# Patient Record
Sex: FEMALE | NOT HISPANIC OR LATINO | URBAN - NONMETROPOLITAN AREA
[De-identification: names, ages, dates, MRNs, and addresses within clinical notes are randomized per-mention and may not be internally consistent; named-entity substitution may affect disease eponyms.]

---

## 2018-03-13 ENCOUNTER — OFFICE VISIT CONVERTED (OUTPATIENT)
Dept: FAMILY MEDICINE CLINIC | Age: 38
End: 2018-03-13
Attending: FAMILY MEDICINE

## 2018-03-27 ENCOUNTER — OFFICE VISIT CONVERTED (OUTPATIENT)
Dept: FAMILY MEDICINE CLINIC | Age: 38
End: 2018-03-27
Attending: NURSE PRACTITIONER

## 2018-07-13 ENCOUNTER — OFFICE VISIT CONVERTED (OUTPATIENT)
Dept: FAMILY MEDICINE CLINIC | Age: 38
End: 2018-07-13
Attending: FAMILY MEDICINE

## 2019-01-18 ENCOUNTER — HOSPITAL ENCOUNTER (OUTPATIENT)
Dept: OTHER | Facility: HOSPITAL | Age: 39
Discharge: HOME | End: 2019-01-18
Attending: FAMILY MEDICINE

## 2019-01-18 ENCOUNTER — OFFICE VISIT CONVERTED (OUTPATIENT)
Dept: FAMILY MEDICINE CLINIC | Age: 39
End: 2019-01-18
Attending: FAMILY MEDICINE

## 2019-01-18 LAB
ALBUMIN SERPL-MCNC: 4.4 G/DL (ref 3.5–5)
ALBUMIN/GLOB SERPL: 1.5 {RATIO} (ref 1.4–2.6)
ALP SERPL-CCNC: 84 U/L (ref 42–98)
ALT SERPL-CCNC: 20 U/L (ref 10–40)
ANION GAP SERPL CALC-SCNC: 18 MMOL/L (ref 8–19)
AST SERPL-CCNC: 17 U/L (ref 15–50)
BILIRUB SERPL-MCNC: 0.24 MG/DL (ref 0.2–1.3)
BUN SERPL-MCNC: 11 MG/DL (ref 5–25)
BUN/CREAT SERPL: 15 {RATIO} (ref 6–20)
CALCIUM SERPL-MCNC: 9.5 MG/DL (ref 8.7–10.4)
CHLORIDE SERPL-SCNC: 101 MMOL/L (ref 99–111)
CHOLEST SERPL-MCNC: 184 MG/DL (ref 107–200)
CHOLEST/HDLC SERPL: 5.3 {RATIO} (ref 3–6)
CONV CO2: 25 MMOL/L (ref 22–32)
CONV TOTAL PROTEIN: 7.4 G/DL (ref 6.3–8.2)
CREAT UR-MCNC: 0.75 MG/DL (ref 0.5–0.9)
GFR SERPLBLD BASED ON 1.73 SQ M-ARVRAT: >60 ML/MIN/{1.73_M2}
GLOBULIN UR ELPH-MCNC: 3 G/DL (ref 2–3.5)
GLUCOSE SERPL-MCNC: 93 MG/DL (ref 65–99)
HDLC SERPL-MCNC: 35 MG/DL (ref 40–60)
LDLC SERPL CALC-MCNC: 87 MG/DL (ref 70–100)
OSMOLALITY SERPL CALC.SUM OF ELEC: 287 MOSM/KG (ref 273–304)
POTASSIUM SERPL-SCNC: 4.7 MMOL/L (ref 3.5–5.3)
SODIUM SERPL-SCNC: 139 MMOL/L (ref 135–147)
TRIGL SERPL-MCNC: 312 MG/DL (ref 40–150)
TSH SERPL-ACNC: 0.98 M[IU]/L (ref 0.27–4.2)
VLDLC SERPL-MCNC: 62 MG/DL (ref 5–37)

## 2019-04-26 ENCOUNTER — OFFICE VISIT CONVERTED (OUTPATIENT)
Dept: FAMILY MEDICINE CLINIC | Age: 39
End: 2019-04-26
Attending: NURSE PRACTITIONER

## 2019-05-15 ENCOUNTER — OFFICE VISIT CONVERTED (OUTPATIENT)
Dept: FAMILY MEDICINE CLINIC | Age: 39
End: 2019-05-15
Attending: NURSE PRACTITIONER

## 2019-05-15 ENCOUNTER — HOSPITAL ENCOUNTER (OUTPATIENT)
Dept: OTHER | Facility: HOSPITAL | Age: 39
Discharge: HOME | End: 2019-05-15
Attending: NURSE PRACTITIONER

## 2019-05-30 ENCOUNTER — OFFICE VISIT CONVERTED (OUTPATIENT)
Dept: FAMILY MEDICINE CLINIC | Age: 39
End: 2019-05-30
Attending: FAMILY MEDICINE

## 2019-06-06 ENCOUNTER — HOSPITAL ENCOUNTER (OUTPATIENT)
Dept: MRI IMAGING | Facility: HOSPITAL | Age: 39
Discharge: HOME | End: 2019-06-06
Attending: FAMILY MEDICINE

## 2019-08-09 ENCOUNTER — HOSPITAL ENCOUNTER (OUTPATIENT)
Dept: OTHER | Facility: HOSPITAL | Age: 39
Discharge: HOME | End: 2019-08-09
Attending: PHYSICIAN ASSISTANT

## 2019-08-09 ENCOUNTER — OFFICE VISIT CONVERTED (OUTPATIENT)
Dept: NEUROSURGERY | Facility: CLINIC | Age: 39
End: 2019-08-09
Attending: PHYSICIAN ASSISTANT

## 2021-05-15 VITALS
DIASTOLIC BLOOD PRESSURE: 101 MMHG | WEIGHT: 293 LBS | SYSTOLIC BLOOD PRESSURE: 156 MMHG | HEIGHT: 64 IN | BODY MASS INDEX: 50.02 KG/M2

## 2021-05-18 NOTE — PROGRESS NOTES
Sherrill ChapmanKarie 1980     Office/Outpatient Visit    Visit Date:  10:26 am    Provider: Hermila Arshad N.P. (Assistant: Deysi Barraza MA)    Location: [facility]        Electronically signed by Hermila Arshad N.P. on  2019 11:15:33 AM                             SUBJECTIVE:        CC:     Renetta is a 39 year old White female.  presents today due to complaints of left hip pain that radiates down leg, numbness X 1 month         HPI:         Patient to be evaluated for back pain.  The discomfort is most prominent in the lower, left lumbar spine.  This radiates to the left buttock and left posterior thigh.  She characterizes it as pinching.  She states that the current episode of pain started one month ago.  She does not recall any precipitating event or injury.  Other details: Has taken Ibuprofen, Aleve, Naproxen, OTC sciatic pain medication (Tylenol, water pill), heating pad, ice, low back pain exercises that she saw on the internet..      ROS:     CONSTITUTIONAL:  Negative for chills and fever.      CARDIOVASCULAR:  Negative for chest pain and palpitations.      RESPIRATORY:  Negative for dyspnea and frequent wheezing.      GASTROINTESTINAL:  Negative for abdominal pain and vomiting.      GENITOURINARY:  Negative for dysuria and frequent urination.      MUSCULOSKELETAL:  Positive for back pain.      NEUROLOGICAL:  Positive for paresthesias.          PMH/FMH/SH:     Last Reviewed on 2019 09:50 AM by Seamus Manzano    Past Medical History:             GYNECOLOGICAL HISTORY:             CURRENT MEDICAL PROVIDERS:    Obstetrician/Gynecologist         Surgical History:         Cholecystectomy    : X 1;      L Leg Vascular Tumor;         Family History:     Father:    Positive for Hypertension;     ; Positive for sleep apnea;     Mother:    Positive for Hypertension;     ; Positive for Depression;     Sister(s):    Positive for Depression;         Social  History:     Occupation: [employer]     Marital Status: Single     Children: 1 child         Tobacco/Alcohol/Supplements:     Last Reviewed on 1/18/2019 09:50 AM by Seamus Manzano    Tobacco: Current Smoker: She currently smokes every day, 1-5 cigarettes per day.          Substance Abuse History:     Last Reviewed on 1/18/2019 09:50 AM by Seamus Manzano    NEGATIVE         Mental Health History:     Last Reviewed on 1/18/2019 09:50 AM by Seamus Manzano        Communicable Diseases (eg STDs):     Last Reviewed on 1/18/2019 09:50 AM by Seamus Manzano            Current Problems:     Last Reviewed on 1/18/2019 09:50 AM by Seamus Manzano    Low back pain     Nasal Congestion     Patient visit for long term (current) use of other drugs     Use of high risk medications     Morbid obesity     Depression with anxiety     Elevated cholesterol     Cigarette smoking     GERD     Hypertension         Immunizations:     DTaP 1981     DTaP 1981     DTaP 1981     DTaP 8/24/1982     DTaP 3/4/1986     Td adult 9/20/1994     OPV  Poliovirus, live (oral) 1981     OPV  Poliovirus, live (oral) 1981     OPV  Poliovirus, live (oral) 8/24/1982     OPV  Poliovirus, live (oral) 3/4/1986     MMR  (Measles-Mumps-Rubella), live 10/16/1991     MMR  (Measles-Mumps-Rubella), live 5/6/1982     Fluzone Quadrivalent (3+ years) 1/18/2019         Allergies:     Last Reviewed on 1/18/2019 09:50 AM by Seamus Manzano    Fish Oil: vomiting        Current Medications:     Last Reviewed on 1/18/2019 09:50 AM by Seamus Manzano    Xanax 1mg Tablet Take 1 tablet(s) by mouth bid prn     Celexa 40mg Tablet Take 1 tablet(s) by mouth daily     Lisinopril/Hydrochlorothiazide 20mg/12.5mg Tablet Take 1 tablet(s) by mouth daily     Simvastatin 20mg Tablet Take 1 tablet(s) by mouth at bedtime     Zantac 150mg Tablet Take 1 tablet(s) by mouth bid prn     Zyrtec 10mg Tablet Take 1 tab(s)  by mouth qhs     Mirena 52mg Intrauterine System as directed         OBJECTIVE:        Vitals:         Current: 4/26/2019 10:31:42 AM    Ht:  5 ft, 4.75 in;  Wt: 310.4 lbs;  BMI: 52.1    T: 98 F (oral);  BP: 133/89 mm Hg (left arm, sitting);  P: 90 bpm (left arm (BP Cuff), sitting);  sCr: 0.75 mg/dL;  GFR: 137.88        Exams:     PHYSICAL EXAM:     GENERAL: well developed, well nourished;  no apparent distress;     RESPIRATORY: normal respiratory rate and pattern with no distress; normal breath sounds with no rales, rhonchi, wheezes or rubs;     CARDIOVASCULAR: normal rate; rhythm is regular;     MUSCULOSKELETAL: gait: slowed;  decreased range of motion noted in: back flexion, extension, and lateral flexion;  pain with range of motion in: back flexion, extension, and lateral flexion;     NEUROLOGIC: mental status: alert and oriented x 3; GROSSLY INTACT     PSYCHIATRIC: appropriate affect and demeanor;         Procedures:     Back pain     1. Kenalog 60 mg given IM in the right hip; administered by [facility];  lot number [id number]; expires 12/2019             ASSESSMENT           724.5   M54.32  Back pain              DDx:     305.1   F17.200  Cigarette smoking              DDx:     278.01   E66.01  Morbid obesity              DDx:         ORDERS:         Meds Prescribed:       Cyclobenzaprine HCl 10mg Tablet 1 tablet q8h PRN muscle spasm; may cause drowsiness  #20 (Twenty) tablet(s) Refills: 0       Prednisone 10mg Tablet take 6 pills today, 5 pills tomorrow, 4 pills day 3, 3 pills day 4, 2 pills day 5 and 1 pill day 6  #21 (Twenty One) tablet(s) Refills: 0         Other Orders:       64053  Therapeutic injection  (In-House)         4004F  Pt scrnd tobacco use rcvd tobacco cessation talk  (In-House)           Calculated BMI above the upper parameter and a follow-up plan was documented in the medical record  (In-House)           Kenalog, per 10 mg (x6)                 PLAN:          Back pain Consider physical  therapy if no improvement with medications. Will call back in 2 weeks if she wants to schedule appt.     Steroids Kenalog 60 mg 1.5 ml     FOLLOW-UP: Schedule follow-up appointments on a p.r.n. basis. Chronic visit follow up     RECOMMENDATIONS given include: alternating cold packs and moist heat, weight loss, smoking cessation, and Stand and take stretch breaks and walk frequently at work..            Prescriptions:       Cyclobenzaprine HCl 10mg Tablet 1 tablet q8h PRN muscle spasm; may cause drowsiness  #20 (Twenty) tablet(s) Refills: 0       Prednisone 10mg Tablet take 6 pills today, 5 pills tomorrow, 4 pills day 3, 3 pills day 4, 2 pills day 5 and 1 pill day 6  #21 (Twenty One) tablet(s) Refills: 0           Orders:       [id number]  Therapeutic injection  (In-House)                     Kenalog, per 10 mg (x6)           Patient Education Handouts:       Exercises - Treatment for Low Back Pain    PTC           Cigarette smoking         RECOMMENDATIONS given include: Counseled on smoking cessation and advised of the benefits to patient's health if she were to stop smoking. Counseling for less than 3 minutes.            Orders:       4004F  Pt scrnd tobacco use rcvd tobacco cessation talk  (In-House)            Morbid obesity     MIPS     BMI Elevated - Follow-Up Plan: She was provided education on weight loss strategies           Orders:         Calculated BMI above the upper parameter and a follow-up plan was documented in the medical record  (In-House)               Patient Recommendations:        For  Back pain:     Alternate cold packs and moist heat for pain relief.     Weight loss will make a tremendous difference in the daily load your back has to support. Losing weight is key to becoming pain free. Stop smoking in order to improve oxygen delivery to the spinal tissues, and therefore help decrease problems with your back over time. I also recommend Stand and take stretch breaks and walk frequently at  work..  Schedule follow-up appointments as needed.          For  Cigarette smoking:         Stop smoking.              CHARGE CAPTURE           **Please note: ICD descriptions below are intended for billing purposes only and may not represent clinical diagnoses**        Primary Diagnosis:         724.5 Back pain            M54.32    Sciatica, left side              Orders:          51704   Office/outpatient visit; established patient, level 3  (In-House)             81582   Therapeutic injection  (In-House)                                           Kenalog, per 10 mg (x6)         305.1 Cigarette smoking            F17.200    Nicotine dependence, unspecified, uncomplicated              Orders:          4004F   Pt scrnd tobacco use rcvd tobacco cessation talk  (In-House)           278.01 Morbid obesity            E66.01    Morbid (severe) obesity due to excess calories              Orders:             Calculated BMI above the upper parameter and a follow-up plan was documented in the medical record  (In-House)

## 2021-05-18 NOTE — PROGRESS NOTES
Sherrill ChapmanKarie 1980     Office/Outpatient Visit    Visit Date: Tue, Mar 27, 2018 12:01 pm    Provider: Apolonia Kimbrough N.P. (Assistant: Philly Erwin MA)    Location: [facility]        Electronically signed by Apolonia Kimbrough N.P. on  2018 09:50:33 PM                             SUBJECTIVE:        CC:     Renetta is a 38 year old White female.  sinuses/cough/allergies;         HPI:         Renetta presents with uRI.  These have been present since yesterday.  The symptoms include progressive, dry cough, nasal congestion and wheezing.  She denies fever or nasal discharge.  She reports recent exposure to illness from family members.  She has already tried to relieve the symptoms with robitussin, loratadine, albuterol.      ROS:     CONSTITUTIONAL:  Positive for fatigue.   Negative for chills or fever.      E/N/T:  Positive for nasal congestion.   Negative for ear pain, frequent rhinorrhea or sore throat.      CARDIOVASCULAR:  Negative for chest pain, palpitations, tachycardia, orthopnea, and edema.      RESPIRATORY:  Positive for recent cough ( typically dry ), dyspnea and frequent wheezing.      GASTROINTESTINAL:  Positive for nausea.   Negative for abdominal pain, constipation, diarrhea or vomiting.      MUSCULOSKELETAL:  Negative for arthralgias, back pain, and myalgias.      NEUROLOGICAL:  Positive for headaches.   Negative for dizziness, fainting or weakness.          PMH/FMH/SH:     Last Reviewed on 3/13/2018 02:04 PM by Seamus Manzano    Past Medical History:             GYNECOLOGICAL HISTORY:             CURRENT MEDICAL PROVIDERS:    Obstetrician/Gynecologist         Surgical History:         Cholecystectomy    : X 1;      L Leg Vascular Tumor;         Family History:     Father:    Positive for Hypertension;     ; Positive for sleep apnea;     Mother:    Positive for Hypertension;     ; Positive for Depression;     Sister(s):    Positive for Depression;          Social History:     Occupation: [employer]     Marital Status: Single     Children: 1 child         Tobacco/Alcohol/Supplements:     Last Reviewed on 3/13/2018 02:04 PM by Seamus Manzano    Tobacco: Current Smoker: She currently smokes every day, 1/2 pack per day.          Substance Abuse History:     Last Reviewed on 3/13/2018 02:04 PM by Seamus Manzano    NEGATIVE         Mental Health History:     Last Reviewed on 3/13/2018 02:04 PM by Seamus Manzano        Communicable Diseases (eg STDs):     Last Reviewed on 3/13/2018 02:04 PM by Seamus Manzano            Current Problems:     Last Reviewed on 3/13/2018 02:04 PM by Seamus Manzano    Patient visit for long term (current) use of other drugs     Use of high risk medications     Morbid obesity     Depression with anxiety     Elevated cholesterol     Cigarette smoking     GERD     Hypertension         Immunizations:     DTaP 1981     DTaP 1981     DTaP 1981     DTaP 8/24/1982     DTaP 3/4/1986     Td adult 9/20/1994     OPV  Poliovirus, live (oral) 1981     OPV  Poliovirus, live (oral) 1981     OPV  Poliovirus, live (oral) 8/24/1982     OPV  Poliovirus, live (oral) 3/4/1986     MMR  (Measles-Mumps-Rubella), live 10/16/1991     MMR  (Measles-Mumps-Rubella), live 5/6/1982         Allergies:     Last Reviewed on 3/13/2018 02:04 PM by Seamus Manzano    Fish Oil: vomiting        Current Medications:     Last Reviewed on 3/27/2018 12:07 PM by Philly Erwin    Lisinopril/Hydrochlorothiazide 20mg/12.5mg Tablet Take 1 tablet(s) by mouth daily     Simvastatin 20mg Tablet Take 1 tablet(s) by mouth at bedtime     Xanax 1mg Tablet Take 1 tablet(s) by mouth bid prn     Zantac 150mg Tablet Take 1 tablet(s) by mouth bid prn     Zyrtec 10mg Tablet Take 1 tab(s) by mouth qhs     Celexa 40mg Tablet Take 1 tablet(s) by mouth daily     Mirena 52mg Intrauterine System as directed     Doxycycline Monohydrate  100mg Capsules Take 1 capsule(s) by mouth bid x10 days     Nicotine Polacrilex 4mg/1square Gum 4 mg PO q1-2h x6wk, then q2-4h x3wk, then q4-8h x3wk; Start: on cigarette quit day; Max: 24 pieces/24h     Loratadine 10mg Tablet 1 tab daily         OBJECTIVE:        Vitals:         Historical:     03/13/2018  BP:   146/94 mm Hg ( (left arm, , sitting, );)     11/09/2017  BP:   139/88 mm Hg ( (left arm, , sitting, );)     03/13/2018  Wt:   289.2lbs        Current: 3/27/2018 12:04:24 PM    Ht:  5 ft, 4.75 in;  Wt: 288.9 lbs;  BMI: 48.4    T: 97.9 F (oral);  BP: 144/93 mm Hg (left arm, sitting);  P: 88 bpm (left arm (BP Cuff), sitting);  sCr: 0.79 mg/dL;  GFR: 128.18        Exams:     PHYSICAL EXAM:     GENERAL: tired-appearing;     E/N/T: EARS: bilateral TMs are normal;  NOSE: nasal mucosa is erythematous;  OROPHARYNX: posterior pharynx, including tonsils, tongue, and uvula are normal;     RESPIRATORY: normal respiratory rate and pattern with no distress; diffuse expiratory wheezes;     CARDIOVASCULAR: normal rate; rhythm is regular;     LYMPHATIC: no enlargement of cervical or facial nodes;     MUSCULOSKELETAL:  Normal range of motion, strength and tone;     NEUROLOGIC: mental status: alert and oriented x 3; GROSSLY INTACT     PSYCHIATRIC:  appropriate affect and demeanor; normal speech pattern; grossly normal memory;         ASSESSMENT           466.0   J20.9  Acute bronchitis              DDx:     478.19   R09.81  Nasal Congestion              DDx:         ORDERS:         Meds Prescribed:       Albuterol 90mcg/1actuation Oral Inhaler 1 to 2 puffs q 4 -6 hours prn  #1 (One) inhaler(s) Refills: 0       Promethazine with Dextromethrophan 6.25mg/15mg per 5ml Syrup 1 to 2 tsp every 4 to 6 hours as needed for cough  #6 (Six) oz Refills: 0       Prednisone 5mg Tablet 8 pills day 1, 6 pills day 2, 4 pills on day three, 2 pills on day 4, and 1 pill on day 5  #21 (Twenty One) tablet(s) Refills: 0       Flonase Allergy Relief  (Fluticasone Propionate) 50mcg/1actuation Nasal Spray Use 1 spray(s) in each nostril daily  #15.8 (Fifteen point Eight) ml Refills: 0                 PLAN:          Acute bronchitis         RECOMMENDATIONS given include: rest, increase oral fluid intake, and promethazine dm may cause drowsiness.  follow up if not improving..            Prescriptions:       Albuterol 90mcg/1actuation Oral Inhaler 1 to 2 puffs q 4 -6 hours prn  #1 (One) inhaler(s) Refills: 0       Promethazine with Dextromethrophan 6.25mg/15mg per 5ml Syrup 1 to 2 tsp every 4 to 6 hours as needed for cough  #6 (Six) oz Refills: 0       Prednisone 5mg Tablet 8 pills day 1, 6 pills day 2, 4 pills on day three, 2 pills on day 4, and 1 pill on day 5  #21 (Twenty One) tablet(s) Refills: 0           Patient Education Handouts:       Acute Bronchitis           Nasal Congestion           Prescriptions:       Flonase Allergy Relief (Fluticasone Propionate) 50mcg/1actuation Nasal Spray Use 1 spray(s) in each nostril daily  #15.8 (Fifteen point Eight) ml Refills: 0             CHARGE CAPTURE           **Please note: ICD descriptions below are intended for billing purposes only and may not represent clinical diagnoses**        Primary Diagnosis:         466.0 Acute bronchitis            J20.9    Acute bronchitis, unspecified              Orders:          85117   Office/outpatient visit; established patient, level 3  (In-House)           478.19 Nasal Congestion            R09.81    Nasal congestion

## 2021-05-18 NOTE — PROGRESS NOTES
Sherrill ChapmanKarie 1980     Office/Outpatient Visit    Visit Date: Wed, May 15, 2019 08:26 am    Provider: Radha Velazquez N.P. (Assistant: Sarah Spurling, MA)    Location: [facility]        Electronically signed by Radha Velazquez N.P. on  05/15/2019 09:11:53 AM                             SUBJECTIVE:        CC:     Renetta is a 39 year old White female.  Pt is here for left leg pain. She has been here before for it. Sometimes the pain is so bad it makes her sick.;         HPI:         Renetta presents with leg pain.  Renetta sustained an injury to.  No precipitating event or injury is identified.  Pain started 1 to 2 weeks ago.  Associated symptoms include burning in her left buttock  shoots down the leg.  Current pain level on a scale of 1 to 10 is a 7.  Pain radiates to: thighs lower legs ankles.  She characterizes the pain as severe and burning.  Pain improves with feels better when standing.  taking Norco that she has for tooth pain - not working at all     ROS:     CONSTITUTIONAL:  Negative for chills, fatigue, fever, and weight change.      CARDIOVASCULAR:  Negative for chest pain, orthopnea, paroxysmal nocturnal dyspnea and pedal edema.      RESPIRATORY:  Negative for dyspnea.      MUSCULOSKELETAL:  Positive for back pain ( chronic ) and limb pain ( left leg pain ).      NEUROLOGICAL:  Negative for paresthesias.      PSYCHIATRIC:  Negative for anxiety, depression, and sleep disturbances.          PMH/FMH/SH:     Last Reviewed on 5/15/2019 08:38 AM by Radha Velazquez    Past Medical History:             GYNECOLOGICAL HISTORY:             CURRENT MEDICAL PROVIDERS:    Obstetrician/Gynecologist         Surgical History:         Cholecystectomy    : X 1;      L Leg Vascular Tumor;         Family History:     Father:    Positive for Hypertension;     ; Positive for sleep apnea;     Mother:    Positive for Hypertension;     ; Positive for Depression;     Sister(s):    Positive for  Depression;         Social History:     Occupation: Mental health facility     Marital Status: Single     Children: 1 child         Tobacco/Alcohol/Supplements:     Last Reviewed on 5/15/2019 08:28 AM by Spurling, Sarah C    Tobacco: Current Smoker: She currently smokes every day, 1/2 pack per day.          Substance Abuse History:     Last Reviewed on 1/18/2019 09:50 AM by Seamus Manzano    NEGATIVE         Mental Health History:     Last Reviewed on 1/18/2019 09:50 AM by Seamus Manzano        Communicable Diseases (eg STDs):     Last Reviewed on 1/18/2019 09:50 AM by Seamus Manzano            Current Problems:     Last Reviewed on 5/15/2019 08:38 AM by Radha Velazquez    Back pain     Low back pain     Nasal Congestion     Patient visit for long term (current) use of other drugs     Use of high risk medications     Morbid obesity     Depression with anxiety     Elevated cholesterol     Cigarette smoking     GERD     Hypertension     Leg pain         Immunizations:     DTaP 1981     DTaP 1981     DTaP 1981     DTaP 8/24/1982     DTaP 3/4/1986     Td adult 9/20/1994     OPV  Poliovirus, live (oral) 1981     OPV  Poliovirus, live (oral) 1981     OPV  Poliovirus, live (oral) 8/24/1982     OPV  Poliovirus, live (oral) 3/4/1986     MMR  (Measles-Mumps-Rubella), live 10/16/1991     MMR  (Measles-Mumps-Rubella), live 5/6/1982     Fluzone Quadrivalent (3+ years) 1/18/2019         Allergies:     Last Reviewed on 5/15/2019 08:28 AM by Spurling, Sarah C    Fish Oil: vomiting        Current Medications:     Last Reviewed on 5/15/2019 08:38 AM by Radha Velazquez    Xanax 1mg Tablet Take 1 tablet(s) by mouth bid prn     Celexa 40mg Tablet Take 1 tablet(s) by mouth daily     Lisinopril/Hydrochlorothiazide 20mg/12.5mg Tablet Take 1 tablet(s) by mouth daily     Simvastatin 20mg Tablet Take 1 tablet(s) by mouth at bedtime     Zantac 150mg Tablet Take 1 tablet(s) by mouth bid prn      Zyrtec 10mg Tablet Take 1 tab(s) by mouth qhs     Mirena 52mg Intrauterine System as directed     Backaid Max 6hr formula - pt takes this PRN for leg pain     Diclofenac Sodium 75mg Tablets, Enteric Coated 1 tab bid     Norco 5mg/325mg Tablet PRN for tooth pain         OBJECTIVE:        Vitals:         Current: 5/15/2019 8:35:01 AM    Ht:  5 ft, 4.75 in;  Wt: 307 lbs;  BMI: 51.5    T: 98 F (oral);  BP: 145/97 mm Hg (left arm, sitting);  P: 68 bpm (left arm (BP Cuff), sitting);  sCr: 0.75 mg/dL;  GFR: 137.24        Repeat:     8:36:22 AM     BP:   134/88mm Hg (right arm, sitting, second take.)         Exams:     PHYSICAL EXAM:     GENERAL: vital signs recorded - well developed, well nourished, obese;  no apparent distress, seems to be in moderate pain;     EYES: extraocular movements intact; conjunctiva and cornea are normal; PERRLA;     E/N/T:  normal EACs, TMs, nasal/oral mucosa, teeth, gingiva, and oropharynx;     NECK: range of motion is normal; thyroid is non-palpable;     RESPIRATORY: normal respiratory rate and pattern with no distress; normal breath sounds with no rales, rhonchi, wheezes or rubs;     CARDIOVASCULAR: normal rate; rhythm is regular;  no systolic murmur; no edema;     MUSCULOSKELETAL: gait: affected by a limp and stooped;     NEUROLOGICAL:  cranial nerves, motor and sensory function, reflexes, gait and coordination are all intact;     PSYCHIATRIC:  appropriate affect and demeanor; normal speech pattern; grossly normal memory;         Procedures:     Leg pain     1. Toradol 60 mg given IM in the left hip; administered by [facility];  lot number -[id number]; expires 12-01-19             ASSESSMENT:           729.5   M79.605   M54.32  Leg pain              DDx:         ORDERS:         Radiology/Test Orders:       [id number]  Left radiologic exam, hip, unilateral; complete, minimum of two views  (Send-Out)         87503  Radiologic examination, spine, lumbosacral;  minimum of four views  (Send-Out)            Procedures Ordered:       REFER  Referral to Specialist or Other Facility  (Send-Out)           Other Orders:       [id number]  Therapeutic injection  (In-House)           Toradol, per 15 mg (x4)                 PLAN:          Leg pain we may need to get MRI after PT if this is not resolved - Instructed to continue the diclofenac as prescribed take routinely, avoid use of otehr NSAIDs while taking         RADIOLOGY:  I have ordered a left hip x-ray and Lumbar/Sacral Spine X-ray to be done today.      REFERRALS:  Referral initiated to physical therapy ( [facility] ).  Narcotic or pain medication Toradol           Orders:       [id number]  Left radiologic exam, hip, unilateral; complete, minimum of two views  (Send-Out)         07609  Radiologic examination, spine, lumbosacral;  minimum of four views  (Send-Out)         REFER  Referral to Specialist or Other Facility  (Send-Out)         97277  Therapeutic injection  (In-House)                     Toradol, per 15 mg (x4)             CHARGE CAPTURE:           Primary Diagnosis:     729.5 Leg pain            M79.605    Pain in left leg           M54.32    Sciatica, left side              Orders:          69191   Office/outpatient visit; established patient, level 3  (In-House)             63977   Therapeutic injection  (In-House)                                           Toradol, per 15 mg (x4)

## 2021-05-18 NOTE — PROGRESS NOTES
Sherrill ChapmanKarie 1980     Office/Outpatient Visit    Visit Date:  09:25 am    Provider: Seamus Manzano MD (Assistant: Ama Joy RN)    Location: [facility]        Electronically signed by Seamus Manzano MD on  2019 05:14:11 PM                             SUBJECTIVE:        CC: follow up - anxiety, blood pressure, lipids         HPI:     Renetta in today for follow up on generalized anxiety disorder.  She has severe anxiety for which she previously saw psychiatry.  She was placed Xanax and citalopram by them about 20 years ago.  She has tried to wean off of Xanax, but she could not function well without it.  She denies abuse, diversion, or doctor shopping.  Fermin is okay.  She is due for drug screen today.  She is not aware of side effects from the medication.  Risks are discussed including addiction, impairment, abuse and overdose potential.         Additionally, she presents with history of hypertension.  her current cardiac medication regimen includes a combination medication ( Zestoretic ).  She did not bring her blood pressure diary, but says that pressures have been okay.  Compliance with treatment has been good.          Elevated cholesterol details; current treatment includes Zocor and diet.  Compliance with treatment has been good; she takes her medication as directed and follows up as directed.  She denies experiencing any hypercholesterolemia related symptoms.      ROS:     CONSTITUTIONAL:  Negative for chills and fever.      CARDIOVASCULAR:  Negative for chest pain and palpitations.      RESPIRATORY:  Negative for recent cough and dyspnea.      GASTROINTESTINAL:  Negative for abdominal pain, nausea and vomiting.      INTEGUMENTARY/BREAST:  Negative for atypical mole(s) and rash.          PMH/FMH/SH:     Last Reviewed on 2019 09:50 AM by Seamus Manzano    Past Medical History:             GYNECOLOGICAL HISTORY:             CURRENT MEDICAL  PROVIDERS:    Obstetrician/Gynecologist         Surgical History:         Cholecystectomy    : X 1;      L Leg Vascular Tumor;         Family History:     Father:    Positive for Hypertension;     ; Positive for sleep apnea;     Mother:    Positive for Hypertension;     ; Positive for Depression;     Sister(s):    Positive for Depression;         Social History:     Occupation:  [employer]     Marital Status: Single     Children: 1 child         Tobacco/Alcohol/Supplements:     Last Reviewed on 2019 09:50 AM by Seamus Manzano    Tobacco: Current Smoker: She currently smokes every day, 1-5 cigarettes per day.          Substance Abuse History:     Last Reviewed on 2019 09:50 AM by Seamus Manzano    NEGATIVE         Mental Health History:     Last Reviewed on 2019 09:50 AM by Seamus Manzano        Communicable Diseases (eg STDs):     Last Reviewed on 2019 09:50 AM by Seamus Manzano            Current Problems:     Last Reviewed on 2019 09:50 AM by Seamus Manzano    Low back pain     Nasal Congestion     Patient visit for long term (current) use of other drugs     Use of high risk medications     Morbid obesity     Depression with anxiety     Elevated cholesterol     Cigarette smoking     GERD     Hypertension         Immunizations:     DTaP 1981     DTaP 1981     DTaP 1981     DTaP 1982     DTaP 3/4/1986     Td adult 1994     OPV  Poliovirus, live (oral) 1981     OPV  Poliovirus, live (oral) 1981     OPV  Poliovirus, live (oral) 1982     OPV  Poliovirus, live (oral) 3/4/1986     MMR  (Measles-Mumps-Rubella), live 10/16/1991     MMR  (Measles-Mumps-Rubella), live 1982         Allergies:     Last Reviewed on 2019 09:50 AM by Seamus Manzano    Fish Oil: vomiting        Current Medications:     Last Reviewed on 2019 09:50 AM by Seamus Manzano    Celexa 40mg Tablet Take 1  tablet(s) by mouth daily     Xanax 1mg Tablet Take 1 tablet(s) by mouth bid prn     Lisinopril/Hydrochlorothiazide 20mg/12.5mg Tablet Take 1 tablet(s) by mouth daily     Simvastatin 20mg Tablet Take 1 tablet(s) by mouth at bedtime     Zantac 150mg Tablet Take 1 tablet(s) by mouth bid prn     Zyrtec 10mg Tablet Take 1 tab(s) by mouth qhs     Mirena 52mg Intrauterine System as directed     Loratadine 10mg Tablet 1 tab daily         OBJECTIVE:        Vitals:         Current: 1/18/2019 9:30:39 AM    Ht:  5 ft, 4.75 in;  Wt: 303.2 lbs;  BMI: 50.8    T: 97.8 F (oral);  BP: 142/85 mm Hg (left arm, sitting);  P: 104 bpm (left arm (BP Cuff), sitting);  sCr: 0.75 mg/dL;  GFR: 137.82        Exams:     PHYSICAL EXAM:     GENERAL: vital signs recorded - well developed,  moderately obese;  no apparent distress;     EYES: extraocular movements intact; conjunctiva and cornea are normal; PERRLA;     E/N/T:  normal EACs, TMs, nasal/oral mucosa, teeth, gingiva, and oropharynx;     NECK: range of motion is normal; thyroid is non-palpable;     RESPIRATORY: normal respiratory rate and pattern with no distress; normal breath sounds with no rales, rhonchi, wheezes or rubs;     CARDIOVASCULAR: normal rate; rhythm is regular;  no systolic murmur; no edema;     GASTROINTESTINAL: nontender; normal bowel sounds; no organomegaly;     BREAST/INTEGUMENT: SKIN: no significant rashes or lesions; no suspicious moles;     PSYCHIATRIC:  appropriate affect and demeanor; normal speech pattern; grossly normal memory;         Procedures:     Influenza vaccination     1. Influenza, seasonal (children 3 years to adult): 0.5 ml unit dose given IM in the right upper arm; administered by [facility];  lot number [id number]; expires 06/30/2019 Regarding contraindications to an Influenza vaccine: Denies moderate/severe illness with/without fever; serious reaction to eggs, egg proteins, gentamicin, gelatin, arginine, neomycin or polymixin; serious reaction after recieving  previous influenza vaccines; and history of Guillain-Houston Syndrome.              ASSESSMENT           V58.69   Z79.899  Use of high risk medications              DDx:     300.4   F41.1  Depression with anxiety              DDx:     401.1   I10  Hypertension              DDx:     272.0   E78.1  Elevated cholesterol              DDx:     278.01   E66.01  Morbid obesity              DDx:     V04.81   Z23  Influenza vaccination              DDx:         ORDERS:         Meds Prescribed:       Refill of: Celexa (Citalopram Hydrobromide) 40mg Tablet Take 1 tablet(s) by mouth daily  #90 (Ninety) tablet(s) Refills: 1       Refill of: Xanax (Alprazolam) 1mg Tablet Take 1 tablet(s) by mouth bid prn  #60 (Sixty) tablet(s) Refills: 2       Refill of: Lisinopril/Hydrochlorothiazide 20mg/12.5mg Tablet Take 1 tablet(s) by mouth daily  #90 (Ninety) tablet(s) Refills: 1       Refill of: Simvastatin 20mg Tablet Take 1 tablet(s) by mouth at bedtime  #90 (Ninety) tablet(s) Refills: 1       Refill of: Zantac (Ranitidine) 150mg Tablet Take 1 tablet(s) by mouth bid prn  #180 (One [place] and [place]) tablet(s) Refills: 1       Refill of: Zyrtec (Cetirizine HCl) 10mg Tablet Take 1 tab(s) by mouth qhs  #90 (Ninety) tablet(s) Refills: 1         Lab Orders:       12803  HTNLP - [facility] CMP AND LIPID: [id number], [id number]  (Send-Out)         23164  TSH - [facility] TSH  (Send-Out)           Procedures Ordered:       73698  Immunization administration; one vaccine  (In-House)           Other Orders:       31316  Influenza virus vaccine, quadrivalent, split virus, preservative free 3 years of age & older  (In-House)         4004F  Pt scrnd tobacco use rcvd tobacco cessation talk  (In-House)           Calculated BMI above the upper parameter and a follow-up plan was documented in the medical record  (In-House)                   PLAN:          Use of high risk medications         RECOMMENDATIONS given include: Today, we have reviewed Renetta's care.  We will  refill her medications as noted below and go from there.  We discussed possibly starting to wean Xanax, but no change is made presently.  If we do that, we will have to be very careful given her history.  Her other problems are noted.  We will arrange labs today..  MIPS Smoking cessation encouraged. Counseling for less than 3 minutes.      BMI Elevated - Follow-Up Plan: She was provided education on weight loss strategies           Orders:       4004F  Pt scrnd tobacco use rcvd tobacco cessation talk  (In-House)           Calculated BMI above the upper parameter and a follow-up plan was documented in the medical record  (In-House)             Patient Education Handouts:       Controlled Prescription Drug education           Depression with anxiety           Prescriptions:       Refill of: Celexa (Citalopram Hydrobromide) 40mg Tablet Take 1 tablet(s) by mouth daily  #90 (Ninety) tablet(s) Refills: 1       Refill of: Xanax (Alprazolam) 1mg Tablet Take 1 tablet(s) by mouth bid prn  #60 (Sixty) tablet(s) Refills: 2          Hypertension           Prescriptions:       Refill of: Lisinopril/Hydrochlorothiazide 20mg/12.5mg Tablet Take 1 tablet(s) by mouth daily  #90 (Ninety) tablet(s) Refills: 1          Elevated cholesterol     LABORATORY:  Labs ordered to be performed today include HTN/Lipid Panel: CMP, Lipid and TSH.            Prescriptions:       Refill of: Simvastatin 20mg Tablet Take 1 tablet(s) by mouth at bedtime  #90 (Ninety) tablet(s) Refills: 1           Orders:       49100  HTNLP - [facility] CMP AND LIPID: [id number], [id number]  (Send-Out)         17997  TSH - [facility] TSH  (Send-Out)            Morbid obesity As above.          Influenza vaccination           Orders:       27258  Immunization administration; one vaccine  (In-House)         12418  Influenza virus vaccine, quadrivalent, split virus, preservative free 3 years of age & older  (In-House)               Other Prescriptions:       Refill of: Zantac (Ranitidine)  150mg Tablet Take 1 tablet(s) by mouth bid prn  #180 (One [place] and Eighty) tablet(s) Refills: 1       Refill of: Zyrtec (Cetirizine HCl) 10mg Tablet Take 1 tab(s) by mouth qhs  #90 (Ninety) tablet(s) Refills: 1         CHARGE CAPTURE           **Please note: ICD descriptions below are intended for billing purposes only and may not represent clinical diagnoses**        Primary Diagnosis:         V58.69 Use of high risk medications            Z79.899    Other long term (current) drug therapy              Orders:          86144   Office/outpatient visit; established patient, level 4  (In-House)             4004F   Pt scrnd tobacco use rcvd tobacco cessation talk  (In-House)                Calculated BMI above the upper parameter and a follow-up plan was documented in the medical record  (In-House)           300.4 Depression with anxiety            F41.1    Generalized anxiety disorder    401.1 Hypertension            I10    Essential (primary) hypertension    272.0 Elevated cholesterol            E78.1    Pure hyperglyceridemia    278.01 Morbid obesity            E66.01    Morbid (severe) obesity due to excess calories    V04.81 Influenza vaccination            Z23    Encounter for immunization              Orders:          47258   Immunization administration; one vaccine  (In-House)             46038   Influenza virus vaccine, quadrivalent, split virus, preservative free 3 years of age & older  (In-House)

## 2021-05-18 NOTE — PROGRESS NOTES
Sherrill ChapmanKarie 1980     Office/Outpatient Visit    Visit Date: Thu, May 30, 2019 10:07 am    Provider: Seamus Manzano MD (Assistant: Ama Joy RN)    Location: [facility]        Electronically signed by Seamus Manzano MD on  2019 08:03:59 AM                             SUBJECTIVE:        CC: low back pain         HPI:     Renetta is in today for follow up on low back pain.  She has been struggling for the last 6 weeks or so.  She says that she just woke up with shooting pain that was shooting down her left leg.  She says that she feels like her calf is on fire.  She has had X-rays and been given Kenalog and Toradol injections.  However, she is not seeing improvement in her pain level.  She says that her pain does not stop.  She says that the pain is better if she is in the recliner.  It is worse when she gets up in the morning.  She denies voiding symptoms - no saddle anesthesia.  She denies addiction issues.  She has been given some hydrocodone fairly recently for dental pain.  She does tolerate that fairly well.  She also has diclofenac that has been prescribed.  Fermin is reviewed and conistent with history.     ROS:     CONSTITUTIONAL:  Negative for chills and fever.      CARDIOVASCULAR:  Negative for chest pain and palpitations.      RESPIRATORY:  Negative for recent cough and dyspnea.      GASTROINTESTINAL:  Negative for abdominal pain, nausea and vomiting.      INTEGUMENTARY/BREAST:  Negative for atypical mole(s) and rash.          PMH/FMH/SH:     Last Reviewed on 2019 10:31 AM by Seamus Manzano    Past Medical History:             GYNECOLOGICAL HISTORY:             CURRENT MEDICAL PROVIDERS:    Obstetrician/Gynecologist         Surgical History:         Cholecystectomy    : X 1;      L Leg Vascular Tumor;         Family History:     Father: Hypertension;  Sleep Apnea     Mother: Hypertension;  Depression     Sister(s): Depression         Social  History:     Occupation: Mental health facility     Marital Status: Single     Children: 1 child         Tobacco/Alcohol/Supplements:     Last Reviewed on 5/30/2019 10:31 AM by Seamus Manzano    Tobacco: Current Smoker: She currently smokes every day, 1/2 to 1 pack per day.          Substance Abuse History:     Last Reviewed on 5/30/2019 10:31 AM by Seamus Manzano    NEGATIVE         Mental Health History:     Last Reviewed on 5/30/2019 10:31 AM by Seamus Manzano        Communicable Diseases (eg STDs):     Last Reviewed on 5/30/2019 10:31 AM by Seamus Manzano            Current Problems:     Last Reviewed on 5/30/2019 10:31 AM by Seamus Manzano    Use of high risk medications     Low back pain     Back pain     Nasal Congestion     Patient visit for long term (current) use of other drugs     Morbid obesity     Depression with anxiety     Elevated cholesterol     Cigarette smoking     GERD     Hypertension     Leg pain         Immunizations:     DTaP 1981     DTaP 1981     DTaP 1981     DTaP 8/24/1982     DTaP 3/4/1986     Td adult 9/20/1994     OPV  Poliovirus, live (oral) 1981     OPV  Poliovirus, live (oral) 1981     OPV  Poliovirus, live (oral) 8/24/1982     OPV  Poliovirus, live (oral) 3/4/1986     MMR  (Measles-Mumps-Rubella), live 10/16/1991     MMR  (Measles-Mumps-Rubella), live 5/6/1982     Fluzone Quadrivalent (3+ years) 1/18/2019         Allergies:     Last Reviewed on 5/30/2019 10:31 AM by Seamus Manzano    Fish Oil: vomiting        Current Medications:     Last Reviewed on 5/30/2019 10:31 AM by Seamus Manzano    Xanax 1mg Tablet Take 1 tablet(s) by mouth bid prn     Celexa 40mg Tablet Take 1 tablet(s) by mouth daily     Lisinopril/Hydrochlorothiazide 20mg/12.5mg Tablet Take 1 tablet(s) by mouth daily     Simvastatin 20mg Tablet Take 1 tablet(s) by mouth at bedtime     Zantac 150mg Tablet Take 1 tablet(s) by mouth bid prn      Zyrtec 10mg Tablet Take 1 tab(s) by mouth qhs     Mirena 52mg Intrauterine System as directed     Diclofenac Sodium 75mg Tablets, Enteric Coated 1 tab bid     Norco 5mg/325mg Tablet PRN for tooth pain     Cyclobenzaprine HCl 10mg Tablet 1 tablet TID prn         OBJECTIVE:        Vitals:         Current: 5/30/2019 10:13:25 AM    Ht:  5 ft, 4.75 in;  Wt: 311.8 lbs;  BMI: 52.3    T: 98.1 F (oral);  BP: 159/90 mm Hg (right arm, sitting);  P: 86 bpm (right arm (BP Cuff), sitting);  sCr: 0.75 mg/dL;  GFR: 138.15        Exams:     PHYSICAL EXAM:     GENERAL: vital signs recorded - well developed,  morbidly obese;  seems to be in moderate pain, tearful;     NECK: range of motion is normal; thyroid is non-palpable;     RESPIRATORY: normal respiratory rate and pattern with no distress; normal breath sounds with no rales, rhonchi, wheezes or rubs;     CARDIOVASCULAR: normal rate; rhythm is regular;  no systolic murmur; no edema;     GASTROINTESTINAL: nontender; normal bowel sounds; no organomegaly;     LYMPHATIC: no enlargement of cervical or facial nodes; no supraclavicular nodes;     BREAST/INTEGUMENT: SKIN: no significant rashes or lesions; no suspicious moles;     MUSCULOSKELETAL: there is limited ROM in the low back due to pain - no focal tenderness is noted;     NEUROLOGIC: there is no focal weakness in the lower legs noted;         Lab/Test Results:             Amphetamines Screen, Urin:  Negative (05/30/2019),     BAR-Barbiturates Screen, Urin:  Negative (05/30/2019),     Buprenorphine:  Negative (05/30/2019),     BZO-Benzodiazepines Screen,Ur:  Positive (05/30/2019),     Cocaine(Metab.)Screen, Ur:  Negative (05/30/2019),     MDMA-Ecstasy:  Negative (05/30/2019),     Met-Methamphetamine:  Negative (05/30/2019),     MTD-Methadone Screen, Urine:  Negative (05/30/2019),     Opiate Screen, Urine:  Negative (05/30/2019),     OXY-Oxycodone:  Negative (05/30/2019),     PCP-Phencyclidine Screen, Uri:  Negative (05/30/2019),      THC Cannabinoids Screen, Urin:  Negative (05/30/2019),     Urine temperature:  confirmed (05/30/2019),     Date and time of last pill:  xanax 5/29/19 600 pm, norco 5/29/19 5 am/and (05/30/2019),     Performed by:  pr (05/30/2019),     Collection Time:  10:46 (05/30/2019),             ASSESSMENT           724.2   M54.5  Low back pain              DDx:     V58.69   Z79.899  Use of high risk medications              DDx:         ORDERS:         Radiology/Test Orders:       [id number]  Magnetic resonance imaging, spinal canal and contents, lumbar; without contrast  (Send-Out)           Lab Orders:       [id number]  Drug test prsmv qual dir optical obs per day  (In-House)           Other Orders:       4004F  Pt scrnd tobacco use rcvd tobacco cessation talk  (In-House)                   PLAN:          Low back pain         RADIOLOGY:  I have ordered MRI Lumbar Spine w/o contrast to be done today.      RECOMMENDATIONS given include: Renetta has been in a lot of pain for the last 6 weeks roughly.  She is not seeing improvement in this.  Her recent imaging studies are reviewed.  I am concerned that she may have a disc issue and pinched nerve.  We will move toward getting MRI.  We also discussed her pain.  I do think a small quantity of hydrocodone is reasonable.  Risk of addiction and overdose particularly with Xanax is reviewed.  We will be very careful moving forward.   We will obtain her consent and drug screen this morning..  MIPS Smoking cessation encouraged. Counseling for less than 3 minutes.            Orders:       4004F  Pt scrnd tobacco use rcvd tobacco cessation talk  (In-House)         40070  Magnetic resonance imaging, spinal canal and contents, lumbar; without contrast  (Send-Out)             Patient Education Handouts:       Sci[employer]a           Use of high risk medications     LABORATORY:  Labs ordered to be performed today include Drug screen.            Orders:       14352  Drug test prsmv qual dir optical obs per day   (In-House)               CHARGE CAPTURE           **Please note: ICD descriptions below are intended for billing purposes only and may not represent clinical diagnoses**        Primary Diagnosis:         724.2 Low back pain            M54.5    Low back pain              Orders:          01341   Office/outpatient visit; established patient, level 4  (In-House)             4004F   Pt scrnd tobacco use rcvd tobacco cessation talk  (In-House)           V58.69 Use of high risk medications            Z79.899    Other long term (current) drug therapy              Orders:          19246   Drug test prsmv qual dir optical obs per day  (In-House)

## 2021-05-18 NOTE — PROGRESS NOTES
Sherrill Chapman 1980     Office/Outpatient Visit    Visit Date: Tue, Mar 13, 2018 01:52 pm    Provider: Seamus Manzano MD (Assistant: Breanna Tellez MA)    Location: [facility]        Electronically signed by Seamus Manzano MD on  03/14/2018 09:18:05 AM                             SUBJECTIVE:        CC: medication follow up, anxiety, blood pressure, cholesterol         HPI:     Renetta in today for follow up on anxiety.  She has a severe issue with anxiety for which she has been on Xanax and citalopram for some time.  This treatment was in initially started by psychiatry about 20 years ago.  She has tried to wean off of Xanax, but she could not function well without it.  She denies abuse, diversion, or doctor shopping.  Fermin is okay.  She did have consistent drug screen at her most recent visit.  She is not aware of side effects from the medication.  Risks are discussed including addiction, impairment, abuse and overdose potential.         Additionally, she presents with history of hypertension.  her current cardiac medication regimen includes a combination medication ( Zestoretic ).  She did not bring her blood pressure diary, but says that pressures have been okay.  Compliance with treatment has been good.          Concerning elevated cholesterol, current treatment includes Zocor and diet.  Compliance with treatment has been good; she takes her medication as directed and follows up as directed.  She denies experiencing any hypercholesterolemia related symptoms.          Renetta has been working on her diet in the last bit.  She has been trying to avoid excess carbs.  She has had a long standing issue with that.         Renetta does continue to smoke.  She estimates that she smokes about 6 cigarettes daily.  She has had difficulty with attempts to stop smoking.         She also has GERD for which she remains on ranitidine.  She does not feel that we could stop this.  It does help control her  symptoms of reflux.     ROS:     CONSTITUTIONAL:  Negative for chills and fever.      EYES:  Negative for blurred vision and eye pain.      E/N/T:  Negative for diminished hearing and nasal congestion.      CARDIOVASCULAR:  Negative for chest pain and palpitations.      RESPIRATORY:  Negative for recent cough and dyspnea.      GASTROINTESTINAL:  Negative for abdominal pain, nausea and vomiting.      GENITOURINARY:  Negative for dysuria and hematuria.      MUSCULOSKELETAL:  Negative for arthralgias and myalgias.      INTEGUMENTARY/BREAST:  Positive for rash (breasts).   Negative for atypical mole(s).      NEUROLOGICAL:  Negative for paresthesias and weakness.      ALLERGIC/IMMUNOLOGIC:  Positive for seasonal allergies and perennial allergies.      PSYCHIATRIC:  Positive for anxiety.   Negative for depression.          PMH/FMH/SH:     Last Reviewed on 3/13/2018 02:04 PM by Seamus Manzano    Past Medical History:             GYNECOLOGICAL HISTORY:             CURRENT MEDICAL PROVIDERS:    Obstetrician/Gynecologist         Surgical History:         Cholecystectomy    : X 1;      L Leg Vascular Tumor;         Family History:     Father:    Positive for Hypertension;     ; Positive for sleep apnea;     Mother:    Positive for Hypertension;     ; Positive for Depression;     Sister(s):    Positive for Depression;         Social History:     Occupation:  [employer]     Marital Status: Single     Children: 1 child         Tobacco/Alcohol/Supplements:     Last Reviewed on 3/13/2018 02:04 PM by Seamus Manzano    Tobacco: Current Smoker: She currently smokes every day, 1/2 pack per day.          Substance Abuse History:     Last Reviewed on 3/13/2018 02:04 PM by Seamus Manzano    NEGATIVE         Mental Health History:     Last Reviewed on 3/13/2018 02:04 PM by Seamus Manzano        Communicable Diseases (eg STDs):     Last Reviewed on 3/13/2018 02:04 PM by Seamus Manzano  Yannick            Current Problems:     Last Reviewed on 3/13/2018 02:04 PM by Seamus Manzano    Patient visit for long term (current) use of other drugs     Use of high risk medications     Morbid obesity     Depression with anxiety     Elevated cholesterol     Cigarette smoking     GERD     Hypertension         Immunizations:     DTaP 1981     DTaP 1981     DTaP 1981     DTaP 8/24/1982     DTaP 3/4/1986     Td adult 9/20/1994     OPV  Poliovirus, live (oral) 1981     OPV  Poliovirus, live (oral) 1981     OPV  Poliovirus, live (oral) 8/24/1982     OPV  Poliovirus, live (oral) 3/4/1986     MMR  (Measles-Mumps-Rubella), live 10/16/1991     MMR  (Measles-Mumps-Rubella), live 5/6/1982         Allergies:     Last Reviewed on 3/13/2018 02:04 PM by Seamus Manzano    Fish Oil: vomiting        Current Medications:     Last Reviewed on 3/13/2018 02:04 PM by Seamus Manzano    Xanax 1mg Tablet Take 1 tablet(s) by mouth bid prn     Celexa 40mg Tablet Take 1 tablet(s) by mouth daily     Lisinopril/Hydrochlorothiazide 20mg/12.5mg Tablet Take 1 tablet(s) by mouth daily     Simvastatin 20mg Tablet Take 1 tablet(s) by mouth at bedtime     Zantac 150mg Tablet Take 1 tablet(s) by mouth bid prn     Zyrtec 10mg Tablet Take 1 tab(s) by mouth qhs     Mirena 52mg Intrauterine System as directed     Nicotine Polacrilex 4mg/1square Gum 4 mg PO q1-2h x6wk, then q2-4h x3wk, then q4-8h x3wk; Start: on cigarette quit day; Max: 24 pieces/24h     Loratadine 10mg Tablet 1 tab daily         OBJECTIVE:        Vitals:         Current: 3/13/2018 1:55:36 PM    Ht:  5 ft, 4.75 in;  Wt: 289.2 lbs;  BMI: 48.5    T: 97.2 F (oral);  BP: 146/94 mm Hg (left arm, sitting);  P: 91 bpm (left arm (BP Cuff), sitting);  sCr: 0.79 mg/dL;  GFR: 128.24        Exams:     PHYSICAL EXAM:     GENERAL: vital signs recorded - well developed,  morbidly obese;  no apparent distress;     EYES: extraocular movements intact;  conjunctiva and cornea are normal; PERRLA;     E/N/T:  normal EACs, TMs, nasal/oral mucosa, teeth, gingiva, and oropharynx;     NECK: range of motion is normal; thyroid is non-palpable;     RESPIRATORY: normal respiratory rate and pattern with no distress; normal breath sounds with no rales, rhonchi, wheezes or rubs;     CARDIOVASCULAR: normal rate; rhythm is regular;  no systolic murmur; no edema;     GASTROINTESTINAL: nontender; normal bowel sounds; no organomegaly;     LYMPHATIC: no enlargement of cervical or facial nodes; no supraclavicular nodes;     BREAST/INTEGUMENT: there are some reddened areas on the medial breasts that may be consistent with folliculitis;     MUSCULOSKELETAL: there is not any joint effusion or redness is noted;     NEUROLOGIC: mental status: alert and oriented x 3; cranial nerves II-XII grossly intact;     PSYCHIATRIC: appropriate affect and demeanor; normal psychomotor function;         ASSESSMENT           V58.69   Z79.899  Use of high risk medications              DDx:     300.4   F41.1  Depression with anxiety              DDx:     401.1   I10  Hypertension              DDx:     272.0   E78.1  Elevated cholesterol              DDx:     278.01   E66.01  Morbid obesity              DDx:     305.1   F17.200  Cigarette smoking              DDx:     530.81   K21.9  GERD              DDx:         ORDERS:         Meds Prescribed:       Refill of: Xanax (Alprazolam) 1mg Tablet Take 1 tablet(s) by mouth bid prn  #60 (Sixty) tablet(s) Refills: 2         Other Orders:       4004F  Pt scrnd tobacco use rcvd tobacco cessation talk  (In-House)           Calculated BMI above the upper parameter and a follow-up plan was documented in the medical record  (In-House)                   PLAN:          Use of high risk medications         RECOMMENDATIONS given include: Today, we have reviewed Renetta's care in detail.  I'm going to continue her current medications and follow closely.  I do feel the  Xanax is necessary.  We did review risks, but she has been compliant with care and tolerates it.  Her other problems have also been reviewed as noted.  We will continue the other medications as noted.  She is encouraged to stop smoking.  We will treat the folliculitis.  If that does not improve, I may refer to dermatology for second opinion..  MIPS Smoking cessation encouraged. Counseling for less than 3 minutes.      BMI Elevated - Follow-Up Plan: She was provided education on weight loss strategies           Orders:       4004F  Pt scrnd tobacco use rcvd tobacco cessation talk  (In-House)           Calculated BMI above the upper parameter and a follow-up plan was documented in the medical record  (In-House)             Patient Education Handouts:       Controlled Prescription Drug education           Depression with anxiety As above.           Prescriptions:       Refill of: Xanax (Alprazolam) 1mg Tablet Take 1 tablet(s) by mouth bid prn  #60 (Sixty) tablet(s) Refills: 2          Hypertension As above.          Elevated cholesterol As above.          Morbid obesity As above.          Cigarette smoking As above.          GERD As above.             CHARGE CAPTURE           **Please note: ICD descriptions below are intended for billing purposes only and may not represent clinical diagnoses**        Primary Diagnosis:         V58.69 Use of high risk medications            Z79.899    Other long term (current) drug therapy              Orders:          07925   Office/outpatient visit; established patient, level 5  (In-House)             4004F   Pt scrnd tobacco use rcvd tobacco cessation talk  (In-House)                Calculated BMI above the upper parameter and a follow-up plan was documented in the medical record  (In-House)           300.4 Depression with anxiety            F41.1    Generalized anxiety disorder    401.1 Hypertension            I10    Essential (primary) hypertension    272.0 Elevated  cholesterol            E78.1    Pure hyperglyceridemia    278.01 Morbid obesity            E66.01    Morbid (severe) obesity due to excess calories    305.1 Cigarette smoking            F17.200    Nicotine dependence, unspecified, uncomplicated    530.81 GERD            K21.9    Gastro-esophageal reflux disease without esophagitis

## 2021-05-18 NOTE — PROGRESS NOTES
Sherrill Chapman 1980     Office/Outpatient Visit    Visit Date: Fri, Jul 13, 2018 01:02 pm    Provider: Seamus Manzano MD (Assistant: Haley Clement MA)    Location: [facility]        Electronically signed by Seamus Manzano MD on  07/16/2018 07:12:27 AM                             SUBJECTIVE:        CC: follow up on anxiety, blood pressure, cholesterol, obesity         HPI:         Renetta in today for follow up on anxiety.  She has severe anxiety for which she has been on Xanax and citalopram for some time.  This treatment was in initially started by psychiatry about 20 years ago.  She has tried to wean off of Xanax, but she could not function well without it.  She has noted that she feels like she is having some issue with staying on task.  She is concerned that she may have ADD.  She denies abuse, diversion, or doctor shopping.  Fermin is okay.  She is due for drug screen today.  She is not aware of side effects from the medication.  Risks are discussed including addiction, impairment, abuse and overdose potential.         Hypertension details; her current cardiac medication regimen includes a combination medication ( Zestoretic ).  She did not bring her blood pressure diary, but says that pressures have been okay.  Compliance with treatment has been good.          In regard to the elevated cholesterol, current treatment includes Zocor and diet.  Compliance with treatment has been good; she takes her medication as directed and follows up as directed.  She denies experiencing any hypercholesterolemia related symptoms.          Renetta says that she was trying to work toward weight loss, but she stopped smoking and was not able to keep weight off.  She is smoking again.         She has pain in the L lower back with some radiating pain down the L lower leg.  She has had some significant pain with this.  She has not had any X-ray or that type of exam.     ROS:     CONSTITUTIONAL:  Negative for chills  and fever.      CARDIOVASCULAR:  Negative for chest pain and palpitations.      RESPIRATORY:  Negative for recent cough and dyspnea.      GASTROINTESTINAL:  Negative for abdominal pain, nausea and vomiting.          PMH/FMH/SH:     Last Reviewed on 2018 01:12 PM by Seamus Manzano    Past Medical History:             GYNECOLOGICAL HISTORY:             CURRENT MEDICAL PROVIDERS:    Obstetrician/Gynecologist         Surgical History:         Cholecystectomy    : X 1;      L Leg Vascular Tumor;         Family History:     Father:    Positive for Hypertension;     ; Positive for sleep apnea;     Mother:    Positive for Hypertension;     ; Positive for Depression;     Sister(s):    Positive for Depression;         Social History:     Occupation:  [employer]     Marital Status: Single     Children: 1 child         Tobacco/Alcohol/Supplements:     Last Reviewed on 2018 01:12 PM by Seamus Manzano    Tobacco: Current Smoker: She currently smokes every day, 1/2 pack per day.          Substance Abuse History:     Last Reviewed on 2018 01:12 PM by Seamus Manzano    NEGATIVE         Mental Health History:     Last Reviewed on 2018 01:12 PM by Seamus Manzano        Communicable Diseases (eg STDs):     Last Reviewed on 2018 01:12 PM by Seamus Manzano            Current Problems:     Last Reviewed on 2018 01:12 PM by Seamus Manzano    Nasal Congestion     Patient visit for long term (current) use of other drugs     Use of high risk medications     Morbid obesity     Depression with anxiety     Elevated cholesterol     Cigarette smoking     GERD     Hypertension         Immunizations:     DTaP 1981     DTaP 1981     DTaP 1981     DTaP 1982     DTaP 3/4/1986     Td adult 1994     OPV  Poliovirus, live (oral) 1981     OPV  Poliovirus, live (oral) 1981     OPV  Poliovirus, live (oral) 1982     OPV   Poliovirus, live (oral) 3/4/1986     MMR  (Measles-Mumps-Rubella), live 10/16/1991     MMR  (Measles-Mumps-Rubella), live 5/6/1982         Allergies:     Last Reviewed on 7/13/2018 01:12 PM by Seamus Manzano    Fish Oil: vomiting        Current Medications:     Last Reviewed on 7/13/2018 01:12 PM by Seamus Manzano    Xanax 1mg Tablet Take 1 tablet(s) by mouth bid prn     Celexa 40mg Tablet Take 1 tablet(s) by mouth daily     Lisinopril/Hydrochlorothiazide 20mg/12.5mg Tablet Take 1 tablet(s) by mouth daily     Simvastatin 20mg Tablet Take 1 tablet(s) by mouth at bedtime     Zantac 150mg Tablet Take 1 tablet(s) by mouth bid prn     Zyrtec 10mg Tablet Take 1 tab(s) by mouth qhs     Mirena 52mg Intrauterine System as directed     Nicotine Polacrilex 4mg/1square Gum 4 mg PO q1-2h x6wk, then q2-4h x3wk, then q4-8h x3wk; Start: on cigarette quit day; Max: 24 pieces/24h     Loratadine 10mg Tablet 1 tab daily         OBJECTIVE:        Vitals:         Current: 7/13/2018 1:07:46 PM    Ht:  5 ft, 4.75 in;  Wt: 305.5 lbs;  BMI: 51.2    T: 99.4 F (oral);  BP: 151/90 mm Hg (left arm, sitting);  P: 84 bpm (left arm (BP Cuff), sitting);  sCr: 0.79 mg/dL;  GFR: 131.26        Exams:     PHYSICAL EXAM:     GENERAL: vital signs recorded - well developed,  morbidly obese;  no apparent distress;     EYES: extraocular movements intact; conjunctiva and cornea are normal; PERRLA;     E/N/T:  normal EACs, TMs, nasal/oral mucosa, teeth, gingiva, and oropharynx;     NECK: range of motion is normal; thyroid is non-palpable;     RESPIRATORY: normal respiratory rate and pattern with no distress; normal breath sounds with no rales, rhonchi, wheezes or rubs;     CARDIOVASCULAR: normal rate; rhythm is regular;  no systolic murmur; no edema;     GASTROINTESTINAL: nontender; normal bowel sounds; no organomegaly;     BREAST/INTEGUMENT: SKIN: no significant rashes or lesions; no suspicious moles;     MUSCULOSKELETAL: there is some  tenderness in the L lower back near the SI region;         ASSESSMENT           V58.69   Z79.899  Use of high risk medications              DDx:     300.4   F41.1  Depression with anxiety              DDx:     401.1   I10  Hypertension              DDx:     272.0   E78.1  Elevated cholesterol              DDx:     278.01   E66.01  Morbid obesity              DDx:     305.1   F17.200  Cigarette smoking              DDx:     724.2   M54.5  Low back pain              DDx:         ORDERS:         Meds Prescribed:       Refill of: Xanax (Alprazolam) 1mg Tablet Take 1 tablet(s) by mouth bid prn  #60 (Sixty) tablet(s) Refills: 2       Refill of: Celexa (Citalopram Hydrobromide) 40mg Tablet Take 1 tablet(s) by mouth daily  #90 (Ninety) tablet(s) Refills: 1       Refill of: Lisinopril/Hydrochlorothiazide 20mg/12.5mg Tablet Take 1 tablet(s) by mouth daily  #90 (Ninety) tablet(s) Refills: 1       Refill of: Simvastatin 20mg Tablet Take 1 tablet(s) by mouth at bedtime  #90 (Ninety) tablet(s) Refills: 1       Refill of: Zantac (Ranitidine) 150mg Tablet Take 1 tablet(s) by mouth bid prn  #180 ([address] [place] [address]) tablet(s) Refills: 1       Refill of: Zyrtec (Cetirizine HCl) 10mg Tablet Take 1 tab(s) by mouth qhs  #90 (Ninety) tablet(s) Refills: 1         Radiology/Test Orders:       [id number]  Radiologic examination, spine, lumbosacral;  minimum of four views  (Send-Out)           Lab Orders:       [id number]  HTNLP - [facility] CMP AND LIPID: [id number]  (Send-Out)         20820  TSH - [facility] TSH  (Send-Out)           Procedures Ordered:       REFER  Referral to Specialist or Other Facility  (Send-Out)                   PLAN:          Use of high risk medications         RECOMMENDATIONS given include: We have again reviewed her care.  I'm going to refer her to psychiatry for evaluation.  She is concerned about ADD.  I don't have any comfort level giving anxiety medication along with treatment for ADD.  Additionally, she felt somewhat out of  control when she stopped smoking.  She may need to stay on some nicotine long term.  It would be better to use some nicotine if needed (patch or gum) if necessary to function and stay off cigarettes.  Labs and X-rays to be done at this time.  Consider referral for PT depending on X-ray..            Patient Education Handouts:       [facility] [place] Medication Compliance           Depression with anxiety         REFERRALS:  Referral initiated to a psychiatrist.            Prescriptions:       Refill of: Xanax (Alprazolam) 1mg Tablet Take 1 tablet(s) by mouth bid prn  #60 (Sixty) tablet(s) Refills: 2       Refill of: Celexa (Citalopram Hydrobromide) 40mg Tablet Take 1 tablet(s) by mouth daily  #90 (Ninety) tablet(s) Refills: 1           Orders:       REFER  Referral to Specialist or Other Facility  (Send-Out)            Hypertension As above.           Prescriptions:       Refill of: Lisinopril/Hydrochlorothiazide 20mg/12.5mg Tablet Take 1 tablet(s) by mouth daily  #90 (Ninety) tablet(s) Refills: 1          Elevated cholesterol     LABORATORY:  Labs ordered to be performed today include HTN/Lipid Panel: CMP, Lipid and TSH.            Prescriptions:       Refill of: Simvastatin 20mg Tablet Take 1 tablet(s) by mouth at bedtime  #90 (Ninety) tablet(s) Refills: 1           Orders:       [id number]  HTNLP - [facility] CMP AND LIPID: [id number]  (Send-Out)         72879  TSH - [facility] TSH  (Send-Out)            Morbid obesity As above.          Cigarette smoking As above.          Low back pain         RADIOLOGY:  I have ordered Lumbar/Sacral Spine X-ray to be done today.            Orders:       [id number]  Radiologic examination, spine, lumbosacral;  minimum of four views  (Send-Out)               Other Prescriptions:       Refill of: Zantac (Ranitidine) 150mg Tablet Take 1 tablet(s) by mouth bid prn  #180 (One [place] [address]) tablet(s) Refills: 1       Refill of: Zyrtec (Cetirizine HCl) 10mg Tablet Take 1 tab(s) by mouth qhs  #90 (Ninety)  tablet(s) Refills: 1         CHARGE CAPTURE           **Please note: ICD descriptions below are intended for billing purposes only and may not represent clinical diagnoses**        Primary Diagnosis:         V58.69 Use of high risk medications            Z79.899    Other long term (current) drug therapy              Orders:          [id number]   Office/outpatient visit; established patient, level 4  (In-House)           300.4 Depression with anxiety            F41.1    Generalized anxiety disorder    401.1 Hypertension            I10    Essential (primary) hypertension    272.0 Elevated cholesterol            E78.1    Pure hyperglyceridemia    278.01 Morbid obesity            E66.01    Morbid (severe) obesity due to excess calories    305.1 Cigarette smoking            F17.200    Nicotine dependence, unspecified, uncomplicated    724.2 Low back pain            M54.5    Low back pain

## 2021-07-01 VITALS
TEMPERATURE: 97.9 F | HEIGHT: 65 IN | DIASTOLIC BLOOD PRESSURE: 93 MMHG | BODY MASS INDEX: 48.13 KG/M2 | SYSTOLIC BLOOD PRESSURE: 144 MMHG | WEIGHT: 288.9 LBS | HEART RATE: 88 BPM

## 2021-07-01 VITALS
SYSTOLIC BLOOD PRESSURE: 146 MMHG | WEIGHT: 289.2 LBS | TEMPERATURE: 97.2 F | HEART RATE: 91 BPM | HEIGHT: 65 IN | DIASTOLIC BLOOD PRESSURE: 94 MMHG | BODY MASS INDEX: 48.18 KG/M2

## 2021-07-01 VITALS
HEIGHT: 65 IN | WEIGHT: 293 LBS | TEMPERATURE: 98 F | DIASTOLIC BLOOD PRESSURE: 89 MMHG | HEART RATE: 90 BPM | SYSTOLIC BLOOD PRESSURE: 133 MMHG | BODY MASS INDEX: 48.82 KG/M2

## 2021-07-01 VITALS
DIASTOLIC BLOOD PRESSURE: 90 MMHG | TEMPERATURE: 98.1 F | WEIGHT: 293 LBS | SYSTOLIC BLOOD PRESSURE: 159 MMHG | BODY MASS INDEX: 48.82 KG/M2 | HEIGHT: 65 IN | HEART RATE: 86 BPM

## 2021-07-01 VITALS
TEMPERATURE: 97.8 F | SYSTOLIC BLOOD PRESSURE: 142 MMHG | WEIGHT: 293 LBS | HEART RATE: 104 BPM | HEIGHT: 65 IN | BODY MASS INDEX: 48.82 KG/M2 | DIASTOLIC BLOOD PRESSURE: 85 MMHG

## 2021-07-01 VITALS
TEMPERATURE: 98 F | BODY MASS INDEX: 48.82 KG/M2 | SYSTOLIC BLOOD PRESSURE: 134 MMHG | DIASTOLIC BLOOD PRESSURE: 88 MMHG | HEIGHT: 65 IN | HEART RATE: 68 BPM | WEIGHT: 293 LBS

## 2021-07-01 VITALS
BODY MASS INDEX: 48.82 KG/M2 | TEMPERATURE: 99.4 F | WEIGHT: 293 LBS | HEART RATE: 84 BPM | HEIGHT: 65 IN | DIASTOLIC BLOOD PRESSURE: 90 MMHG | SYSTOLIC BLOOD PRESSURE: 151 MMHG